# Patient Record
Sex: MALE | Race: WHITE | NOT HISPANIC OR LATINO | Employment: UNEMPLOYED | URBAN - METROPOLITAN AREA
[De-identification: names, ages, dates, MRNs, and addresses within clinical notes are randomized per-mention and may not be internally consistent; named-entity substitution may affect disease eponyms.]

---

## 2018-02-16 ENCOUNTER — OFFICE VISIT (OUTPATIENT)
Dept: WOUND CARE | Facility: HOSPITAL | Age: 83
End: 2018-02-16
Attending: NURSE PRACTITIONER
Payer: MEDICARE

## 2018-02-16 VITALS
SYSTOLIC BLOOD PRESSURE: 128 MMHG | DIASTOLIC BLOOD PRESSURE: 82 MMHG | TEMPERATURE: 98 F | HEART RATE: 62 BPM | RESPIRATION RATE: 18 BRPM

## 2018-02-16 DIAGNOSIS — L97.522 ULCER OF LEFT FOOT, WITH FAT LAYER EXPOSED: ICD-10-CM

## 2018-02-16 DIAGNOSIS — I70.245 ATHSCL NATIVE ARTERIES OF LEFT LEG W ULCERATION OTH PRT FOOT: ICD-10-CM

## 2018-02-16 PROCEDURE — 99202 OFFICE O/P NEW SF 15 MIN: CPT

## 2018-02-16 RX ORDER — ROSUVASTATIN CALCIUM 10 MG/1
10 TABLET, COATED ORAL DAILY
COMMUNITY
End: 2018-10-10

## 2018-02-16 RX ORDER — LEVOTHYROXINE SODIUM 75 UG/1
75 TABLET ORAL DAILY
COMMUNITY

## 2018-02-16 RX ORDER — LORATADINE 10 MG/1
10 TABLET ORAL DAILY
COMMUNITY
End: 2018-10-10

## 2018-02-16 RX ORDER — MEMANTINE HYDROCHLORIDE 10 MG/1
5 TABLET ORAL 2 TIMES DAILY
COMMUNITY
End: 2018-10-10

## 2018-02-16 RX ORDER — ASPIRIN 81 MG/1
81 TABLET ORAL DAILY
COMMUNITY
End: 2019-12-23 | Stop reason: ALTCHOICE

## 2018-02-16 RX ORDER — CLOPIDOGREL BISULFATE 75 MG/1
75 TABLET ORAL DAILY
COMMUNITY
End: 2018-10-10

## 2018-02-16 RX ORDER — ESCITALOPRAM OXALATE 10 MG/1
10 TABLET ORAL DAILY
COMMUNITY
End: 2018-10-10

## 2018-02-16 NOTE — PROGRESS NOTES
[facility]  Wound Care  Progress Note    Problem List Items Addressed This Visit        Derm    Ulcer of left foot, with fat layer exposed    Overview     HPI 85 yr old male with history of PVD, unable to improve circulation via intervention. Pt on plavix and baby asa. Encouraged by cardiology to exercise to improve collateral circulation.    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 He appears well-developed and well-nourished.   HENT:   Head: Normocephalic.   Cardiovascular:   Denies chest pain     Pulmonary/Chest: Effort normal.   Musculoskeletal: He exhibits tenderness.   Tenderness to wound when walking   Neurological: He is alert and oriented to person, place, and time.   Skin: Skin is warm and dry.   Psychiatric: He has a normal mood and affect. His behavior is normal. Judgment and thought content normal.     Pt with arterial wound, will keep wound dry. Instructed to not wet wound with shower. Apperature pad applied and Darco shoe with peg assist insert given to patient. Will use gentian violet to wound.  See wound doc progress notes. Documents will be scanned.        Sophy Mackenzie  [facility]

## 2018-02-23 ENCOUNTER — HOSPITAL ENCOUNTER (OUTPATIENT)
Dept: RADIOLOGY | Facility: HOSPITAL | Age: 83
Discharge: HOME | End: 2018-02-23
Attending: NURSE PRACTITIONER
Payer: MEDICARE

## 2018-02-23 ENCOUNTER — OFFICE VISIT (OUTPATIENT)
Dept: WOUND CARE | Facility: HOSPITAL | Age: 83
End: 2018-02-23
Attending: NURSE PRACTITIONER
Payer: MEDICARE

## 2018-02-23 VITALS
RESPIRATION RATE: 18 BRPM | DIASTOLIC BLOOD PRESSURE: 75 MMHG | SYSTOLIC BLOOD PRESSURE: 130 MMHG | TEMPERATURE: 98 F | HEART RATE: 66 BPM

## 2018-02-23 DIAGNOSIS — I70.245 ATHSCL NATIVE ARTERIES OF LEFT LEG W ULCERATION OTH PRT FOOT: ICD-10-CM

## 2018-02-23 DIAGNOSIS — L97.522 ULCER OF LEFT FOOT, WITH FAT LAYER EXPOSED: Primary | ICD-10-CM

## 2018-02-23 DIAGNOSIS — L97.522 ULCER OF LEFT FOOT, WITH FAT LAYER EXPOSED: ICD-10-CM

## 2018-02-23 PROCEDURE — 99211 OFF/OP EST MAY X REQ PHY/QHP: CPT | Mod: 25

## 2018-02-23 PROCEDURE — 73630 X-RAY EXAM OF FOOT: CPT | Mod: TC,LT

## 2018-02-23 PROCEDURE — 73630 X-RAY EXAM OF FOOT: CPT | Mod: 26,LT,, | Performed by: RADIOLOGY

## 2018-02-23 NOTE — PROGRESS NOTES
[facility]  Wound Care  Progress Note    Problem List Items Addressed This Visit        Derm    Ulcer of left foot, with fat layer exposed - Primary    Overview     HPI 85 yr old male with history of PVD, unable to improve circulation via intervention. Pt on plavix and baby asa. Encouraged by cardiology to exercise to improve collateral circulation.    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 He appears well-developed and well-nourished.   HENT:   Head: Normocephalic.   Pulmonary/Chest: Effort normal.   Musculoskeletal: Normal range of motion.   Neurological: He is alert and oriented to person, place, and time.   Skin: Skin is warm and dry.   Pt's left lateral foot wound is 100% eschar. Very tender to plantar area of wound, will send for a x-ray to r/o osteo. Unable to debride wound due to poor vascular status. Left great toe has ingrown toenail that is red and tender, toenail corner clipped and pus drained from corner. Will prescribe bactroban bid   Psychiatric: He has a normal mood and affect. His behavior is normal. Judgment and thought content normal.       Will recheck pt next week, instructed to call sooner if any problems or concerns arise.  See wound doc progress notes. Documents will be scanned.        Sophy Mackenzie  [facility]

## 2018-03-02 ENCOUNTER — OFFICE VISIT (OUTPATIENT)
Dept: WOUND CARE | Facility: HOSPITAL | Age: 83
End: 2018-03-02
Attending: NURSE PRACTITIONER
Payer: MEDICARE

## 2018-03-02 VITALS
TEMPERATURE: 98 F | SYSTOLIC BLOOD PRESSURE: 120 MMHG | RESPIRATION RATE: 18 BRPM | HEART RATE: 88 BPM | DIASTOLIC BLOOD PRESSURE: 73 MMHG

## 2018-03-02 DIAGNOSIS — I70.245 ATHSCL NATIVE ARTERIES OF LEFT LEG W ULCERATION OTH PRT FOOT: ICD-10-CM

## 2018-03-02 DIAGNOSIS — L97.522 ULCER OF LEFT FOOT, WITH FAT LAYER EXPOSED: Primary | ICD-10-CM

## 2018-03-02 PROCEDURE — 99211 OFF/OP EST MAY X REQ PHY/QHP: CPT

## 2018-03-02 NOTE — PROGRESS NOTES
[facility]  Wound Care  Progress Note    Problem List Items Addressed This Visit        Derm    Ulcer of left foot, with fat layer exposed - Primary    Overview     HPI 85 yr old male with history of PVD, unable to improve circulation via intervention. Pt on plavix and baby asa. Encouraged by cardiology to exercise to improve collateral circulation.    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 He appears well-developed and well-nourished.   Pt denies chest pain or shortness of breath, appetite is good and no history of fever or chills.   HENT:   Head: Normocephalic.   Pulmonary/Chest: Effort normal.   Musculoskeletal: Normal range of motion.   Neurological: He is alert and oriented to person, place, and time.   Skin: Skin is warm and dry. Capillary refill takes 2 to 3 seconds.   Wound to plantar left foot is mostly dry with a small area that is open with scant drainage, not as painful as last week, due to pt's vascular history, will not debride and will continue to dry it out with gentian violet.    Psychiatric: He has a normal mood and affect. His behavior is normal. Thought content normal.   Nursing note and vitals reviewed.      Will continue to monitor wound weekly  See wound doc progress notes. Documents will be scanned.        Sophy Mackenzie  [facility]

## 2018-03-09 ENCOUNTER — OFFICE VISIT (OUTPATIENT)
Dept: WOUND CARE | Facility: HOSPITAL | Age: 83
End: 2018-03-09
Attending: NURSE PRACTITIONER
Payer: MEDICARE

## 2018-03-09 DIAGNOSIS — I70.245 ATHSCL NATIVE ARTERIES OF LEFT LEG W ULCERATION OTH PRT FOOT: ICD-10-CM

## 2018-03-09 DIAGNOSIS — L97.522 ULCER OF LEFT FOOT, WITH FAT LAYER EXPOSED: Primary | ICD-10-CM

## 2018-03-09 PROCEDURE — 99211 OFF/OP EST MAY X REQ PHY/QHP: CPT

## 2018-03-09 NOTE — PROGRESS NOTES
[facility]  Wound Care  Progress Note    Problem List Items Addressed This Visit        Derm    Ulcer of left foot, with fat layer exposed - Primary    Overview     HPI 85 yr old male with history of PVD, unable to improve circulation via intervention. Pt on plavix and baby asa. Encouraged by cardiology to exercise to improve collateral circulation.    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 He appears well-developed and well-nourished.   HENT:   Head: Normocephalic.   Pulmonary/Chest: Effort normal.   Musculoskeletal: Normal range of motion.   Neurological: He is alert and oriented to person, place, and time.   Skin: Skin is warm and dry.   Pt with arterial ulcer to left foot, vascular status is optimized but pt has significant blockage, rides his exercise bike to increase collateral circulation   Psychiatric: He has a normal mood and affect. His behavior is normal. Judgment and thought content normal.     Will re-check in 2 weeks since wound is vascular and cannot debrided.  See wound doc progress notes. Documents will be scanned.        Sophy Mackenzie  [facility]

## 2018-03-12 VITALS — DIASTOLIC BLOOD PRESSURE: 70 MMHG | HEART RATE: 86 BPM | SYSTOLIC BLOOD PRESSURE: 120 MMHG | RESPIRATION RATE: 18 BRPM

## 2018-03-23 ENCOUNTER — OFFICE VISIT (OUTPATIENT)
Dept: WOUND CARE | Facility: HOSPITAL | Age: 83
End: 2018-03-23
Attending: NURSE PRACTITIONER
Payer: MEDICARE

## 2018-03-23 VITALS — SYSTOLIC BLOOD PRESSURE: 143 MMHG | HEART RATE: 76 BPM | DIASTOLIC BLOOD PRESSURE: 86 MMHG

## 2018-03-23 DIAGNOSIS — L97.522 ULCER OF LEFT FOOT, WITH FAT LAYER EXPOSED: ICD-10-CM

## 2018-03-23 DIAGNOSIS — I70.245 ATHSCL NATIVE ARTERIES OF LEFT LEG W ULCERATION OTH PRT FOOT: Primary | ICD-10-CM

## 2018-03-23 PROCEDURE — 99211 OFF/OP EST MAY X REQ PHY/QHP: CPT

## 2018-03-23 NOTE — PROGRESS NOTES
[facility]  Wound Care  Progress Note    Problem List Items Addressed This Visit        Derm    Ulcer of left foot, with fat layer exposed    Overview     HPI 85 yr old male with history of PVD, unable to improve circulation via intervention. Pt on plavix and baby asa. Encouraged by cardiology to exercise to improve collateral circulation.    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 He appears well-developed and well-nourished.   Pulmonary/Chest: Effort normal.   Musculoskeletal: Normal range of motion.   Neurological: He is alert and oriented to person, place, and time.   Skin: Skin is warm and dry.   arterail ulcer to left lateral foot with fat layer exposed, had an apperature pad around wound but it caused the wound to bulge through the pad due to edema in his foot. Will stop apperature pad and cover wound with silver alginate, pt wearing a croc shoe, encouraged to wear the Darco shoe with Peg assist insert that was provided to him.    Psychiatric: He has a normal mood and affect. His behavior is normal. Judgment and thought content normal.     Will re-check in 2 weeks, pt unable to come next week because he has plans for Good Friday.    See wound doc progress notes. Documents will be scanned.        Sophy Mackenzie  [facility]

## 2018-03-30 ENCOUNTER — OFFICE VISIT (OUTPATIENT)
Dept: WOUND CARE | Facility: HOSPITAL | Age: 83
End: 2018-03-30
Attending: NURSE PRACTITIONER
Payer: MEDICARE

## 2018-03-30 VITALS
DIASTOLIC BLOOD PRESSURE: 86 MMHG | TEMPERATURE: 98 F | SYSTOLIC BLOOD PRESSURE: 145 MMHG | RESPIRATION RATE: 18 BRPM | HEART RATE: 58 BPM

## 2018-03-30 DIAGNOSIS — I70.245 ATHSCL NATIVE ARTERIES OF LEFT LEG W ULCERATION OTH PRT FOOT: Primary | ICD-10-CM

## 2018-03-30 DIAGNOSIS — L97.522 ULCER OF LEFT FOOT, WITH FAT LAYER EXPOSED: ICD-10-CM

## 2018-03-30 PROCEDURE — 99211 OFF/OP EST MAY X REQ PHY/QHP: CPT

## 2018-03-30 NOTE — PROGRESS NOTES
[facility]  Wound Care  Progress Note    Problem List Items Addressed This Visit        Derm    Ulcer of left foot, with fat layer exposed    Overview     HPI 85 yr old male with history of PVD, unable to improve circulation via intervention. Pt on plavix and baby asa. Encouraged by cardiology to exercise to improve collateral circulation.    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 He appears well-developed and well-nourished.   HENT:   Head: Normocephalic.   Abdominal: Soft.   Musculoskeletal: Normal range of motion.   Neurological: He is alert and oriented to person, place, and time.   Skin: Skin is warm and dry.   Wound to left foot with fat layer exposed due to PAD. Looks good with small amount of slough   Psychiatric: He has a normal mood and affect. His behavior is normal. Judgment and thought content normal.     Will re-check in 2 weeks  See wound doc progress notes. Documents will be scanned.        Sophy Mackenzie  [facility]

## 2018-04-13 ENCOUNTER — OFFICE VISIT (OUTPATIENT)
Dept: WOUND CARE | Facility: HOSPITAL | Age: 83
End: 2018-04-13
Attending: NURSE PRACTITIONER
Payer: MEDICARE

## 2018-04-13 VITALS
HEART RATE: 48 BPM | RESPIRATION RATE: 18 BRPM | SYSTOLIC BLOOD PRESSURE: 152 MMHG | DIASTOLIC BLOOD PRESSURE: 75 MMHG | TEMPERATURE: 98 F

## 2018-04-13 DIAGNOSIS — L97.522 ULCER OF LEFT FOOT, WITH FAT LAYER EXPOSED: Primary | ICD-10-CM

## 2018-04-13 PROCEDURE — 27201912 HC WOUND CARE DEBRIDEMENT SUPPLIES

## 2018-04-13 PROCEDURE — 11042 DBRDMT SUBQ TIS 1ST 20SQCM/<: CPT

## 2018-04-13 NOTE — PROGRESS NOTES
[facility]  Wound Care  Progress Note    Problem List Items Addressed This Visit        Derm    Ulcer of left foot, with fat layer exposed - Primary    Overview     HPI 85 yr old male with history of PVD, unable to improve circulation via intervention. Pt on plavix and baby asa. Encouraged by cardiology to exercise to improve collateral circulation.    Location: left lateral foot    Duration: 1-5-2018    Context: arterial    Associated Signs & Symptoms: pain    Timing: when walking    Severity: 5/10    Quality: sharp and burning    Modifying Factors: rest                 Physical Exam   Constitutional: He is oriented to person, place, and time. He appears well-developed and well-nourished.   HENT:   Head: Normocephalic.   Musculoskeletal: Normal range of motion.   Neurological: He is alert and oriented to person, place, and time.   Skin: Skin is warm and dry.   Wound is doing well, fat layer exposed with slough, wound is arterial . Loose slough gently removed from wound bed   Psychiatric: He has a normal mood and affect. His behavior is normal. Judgment and thought content normal.   will re-check next week    See wound doc progress notes. Documents will be scanned.        Sophy Mackenzie  [facility]

## 2018-04-20 ENCOUNTER — OFFICE VISIT (OUTPATIENT)
Dept: WOUND CARE | Facility: HOSPITAL | Age: 83
End: 2018-04-20
Attending: NURSE PRACTITIONER
Payer: MEDICARE

## 2018-04-20 VITALS
DIASTOLIC BLOOD PRESSURE: 81 MMHG | HEART RATE: 41 BPM | TEMPERATURE: 98 F | RESPIRATION RATE: 18 BRPM | SYSTOLIC BLOOD PRESSURE: 169 MMHG

## 2018-04-20 DIAGNOSIS — I70.245 ATHSCL NATIVE ARTERIES OF LEFT LEG W ULCERATION OTH PRT FOOT: ICD-10-CM

## 2018-04-20 DIAGNOSIS — L97.522 ULCER OF LEFT FOOT, WITH FAT LAYER EXPOSED: Primary | ICD-10-CM

## 2018-04-20 PROCEDURE — 11042 DBRDMT SUBQ TIS 1ST 20SQCM/<: CPT

## 2018-04-20 PROCEDURE — 27201912 HC WOUND CARE DEBRIDEMENT SUPPLIES

## 2018-04-20 NOTE — PROGRESS NOTES
[facility]  Wound Care  Progress Note    Problem List Items Addressed This Visit        Derm    Ulcer of left foot, with fat layer exposed - Primary    Overview     HPI 85 yr old male with history of PVD, unable to improve circulation via intervention. Pt on plavix and baby asa. Encouraged by cardiology to exercise to improve collateral circulation.    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 He appears well-developed and well-nourished.   HENT:   Head: Normocephalic.   Pulmonary/Chest: Effort normal.   Musculoskeletal: Normal range of motion.   Neurological: He is alert and oriented to person, place, and time.   Skin: Skin is warm and dry.   Arterial wound to left foot with adipose and loose slough. Carefully removed the slough, will continue mepilex ag    Psychiatric: He has a normal mood and affect. His behavior is normal. Judgment and thought content normal.     Will re-check in 2 weeks  See wound doc progress notes. Documents will be scanned.        Sophy Mackenzie  [facility]

## 2018-05-04 ENCOUNTER — OFFICE VISIT (OUTPATIENT)
Dept: WOUND CARE | Facility: HOSPITAL | Age: 83
End: 2018-05-04
Attending: NURSE PRACTITIONER
Payer: MEDICARE

## 2018-05-04 VITALS
TEMPERATURE: 98 F | RESPIRATION RATE: 18 BRPM | HEART RATE: 61 BPM | DIASTOLIC BLOOD PRESSURE: 79 MMHG | SYSTOLIC BLOOD PRESSURE: 127 MMHG

## 2018-05-04 DIAGNOSIS — L97.522 ULCER OF LEFT FOOT, WITH FAT LAYER EXPOSED: Primary | ICD-10-CM

## 2018-05-04 PROCEDURE — 27201912 HC WOUND CARE DEBRIDEMENT SUPPLIES

## 2018-05-04 PROCEDURE — 11042 DBRDMT SUBQ TIS 1ST 20SQCM/<: CPT

## 2018-05-04 NOTE — PROGRESS NOTES
[facility]  Wound Care  Progress Note    Problem List Items Addressed This Visit        Derm    Ulcer of left foot, with fat layer exposed - Primary    Overview     HPI 85 yr old male with history of PVD, unable to improve circulation via intervention. Pt on plavix and baby asa. Encouraged by cardiology to exercise to improve collateral circulation.    Location: left lateral foot    Duration: 1-5-2018    Context: arterial    Associated Signs & Symptoms: pain    Timing: when walking    Severity: 5/10    Quality: sharp and burning    Modifying Factors: rest                 Physical Exam   Constitutional: He is oriented to person, place, and time. He appears well-developed and well-nourished.   HENT:   Head: Normocephalic.   Pulmonary/Chest: Effort normal.   Musculoskeletal: Normal range of motion.   Neurological: He is alert and oriented to person, place, and time.   Skin: Skin is warm and dry.   Arterial wound to left foot with adipose and loose slough.   Psychiatric: He has a normal mood and affect. His behavior is normal. Judgment and thought content normal.     Will re-check in 2 weeks, will continue current treatment. Loose slough carefully removed. Continue to offload with Darco shoe.  See wound doc progress notes. Documents will be scanned.        Sophy Mackenzie  [facility]

## 2018-05-18 ENCOUNTER — OFFICE VISIT (OUTPATIENT)
Dept: WOUND CARE | Facility: HOSPITAL | Age: 83
End: 2018-05-18
Attending: NURSE PRACTITIONER
Payer: MEDICARE

## 2018-05-18 VITALS
SYSTOLIC BLOOD PRESSURE: 138 MMHG | DIASTOLIC BLOOD PRESSURE: 72 MMHG | HEART RATE: 48 BPM | RESPIRATION RATE: 18 BRPM | TEMPERATURE: 98 F

## 2018-05-18 DIAGNOSIS — I70.245 ATHSCL NATIVE ARTERIES OF LEFT LEG W ULCERATION OTH PRT FOOT: Primary | ICD-10-CM

## 2018-05-18 DIAGNOSIS — L97.522 ULCER OF LEFT FOOT, WITH FAT LAYER EXPOSED: ICD-10-CM

## 2018-05-18 PROCEDURE — 11042 DBRDMT SUBQ TIS 1ST 20SQCM/<: CPT

## 2018-05-18 PROCEDURE — 27201912 HC WOUND CARE DEBRIDEMENT SUPPLIES

## 2018-05-18 NOTE — PROGRESS NOTES
[facility]  Wound Care  Progress Note    Problem List Items Addressed This Visit        Derm    Ulcer of left foot, with fat layer exposed    Overview     HPI 85 yr old male with history of PVD, unable to improve circulation via intervention. Pt on plavix and baby asa. Encouraged by cardiology to exercise to improve collateral circulation.    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 He appears well-developed and well-nourished.   HENT:   Head: Normocephalic.   Pulmonary/Chest: Effort normal.   Neurological: He is alert and oriented to person, place, and time.   Skin: Skin is warm and dry.   Arterial ulcer to left lateral foot. Adipose and slough noted. Loose slough carefully removed, no bleeding noted. Darco shoe is causing pt to have plantar fasciitis, Pt ok'd to wear a soft, supportive, high quality shoe. Offload pressure as much as possible.   Psychiatric: He has a normal mood and affect. His behavior is normal. Judgment and thought content normal.     Will come in 2 weeks for a nurse visit. Continue current treatment.  See wound doc progress notes. Documents will be scanned.        Sophy Mackenzie  [facility]

## 2018-05-25 ENCOUNTER — OFFICE VISIT (OUTPATIENT)
Dept: WOUND CARE | Facility: HOSPITAL | Age: 83
End: 2018-05-25
Attending: NURSE PRACTITIONER
Payer: MEDICARE

## 2018-05-25 VITALS — DIASTOLIC BLOOD PRESSURE: 88 MMHG | HEART RATE: 88 BPM | SYSTOLIC BLOOD PRESSURE: 152 MMHG | RESPIRATION RATE: 18 BRPM

## 2018-05-25 DIAGNOSIS — I70.245 ATHSCL NATIVE ARTERIES OF LEFT LEG W ULCERATION OTH PRT FOOT: Primary | ICD-10-CM

## 2018-05-25 PROCEDURE — 11042 DBRDMT SUBQ TIS 1ST 20SQCM/<: CPT

## 2018-05-25 PROCEDURE — 27201912 HC WOUND CARE DEBRIDEMENT SUPPLIES

## 2018-05-25 NOTE — PROGRESS NOTES
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 He appears well-developed and well-nourished.   Pulmonary/Chest: Effort normal.   Musculoskeletal: Normal range of motion.   Pt c/o pain in his arch of left foot. Wears crocs because darco shoe bothered him. Encouraged to get shoes big enough and with good arch support.    Neurological: He is alert and oriented to person, place, and time.   Skin: Skin is warm and dry.   Arterial ulcer to left lateral foot, doing well and getting smaller, adipose and a small amount of slough noted, healthy granulation tissue noted.   Psychiatric: He has a normal mood and affect. His behavior is normal. Judgment and thought content normal.     Small amount of slough gently removed, no bleeding noted, will continue mepilex ag and will re-check in 2 weeks, pt offered a nurse visit in 1 week and stated he's come back in 2 weeks.  See wound doc progress notes. Documents will be scanned.        Sophy Mackenzie  [facility]

## 2018-06-08 ENCOUNTER — OFFICE VISIT (OUTPATIENT)
Dept: WOUND CARE | Facility: HOSPITAL | Age: 83
End: 2018-06-08
Attending: NURSE PRACTITIONER
Payer: MEDICARE

## 2018-06-08 VITALS
SYSTOLIC BLOOD PRESSURE: 129 MMHG | HEART RATE: 63 BPM | DIASTOLIC BLOOD PRESSURE: 81 MMHG | RESPIRATION RATE: 18 BRPM | TEMPERATURE: 98 F

## 2018-06-08 DIAGNOSIS — L97.522 ULCER OF LEFT FOOT, WITH FAT LAYER EXPOSED: Primary | ICD-10-CM

## 2018-06-08 PROCEDURE — 27201912 HC WOUND CARE DEBRIDEMENT SUPPLIES

## 2018-06-08 PROCEDURE — 11042 DBRDMT SUBQ TIS 1ST 20SQCM/<: CPT

## 2018-06-08 NOTE — PROGRESS NOTES
[facility]  Wound Care  Progress Note    Problem List Items Addressed This Visit        Derm    Ulcer of left foot, with fat layer exposed - Primary    Overview     HPI 85 yr old male with history of PVD, unable to improve circulation via intervention. Pt on plavix and baby asa. Encouraged by cardiology to exercise to improve collateral circulation.    Location: left lateral foot    Duration: 1-5-2018    Context: arterial    Associated Signs & Symptoms: pain    Timing: when walking    Severity: 5/10    Quality: sharp and burning    Modifying Factors: rest  6-8-18: wound is doing well, denies pain to foot               Physical Exam   Constitutional: He is oriented to person, place, and time. He appears well-developed and well-nourished.   HENT:   Head: Normocephalic.   Pulmonary/Chest: Effort normal.   Musculoskeletal: Normal range of motion.   Neurological: He is alert and oriented to person, place, and time.   Skin: Skin is warm and dry.   Arterial ulcer to left foot with adipose and small amount of slough, foot is warm and dry and pink   Psychiatric: He has a normal mood and affect. His behavior is normal. Judgment and thought content normal.   will re-check in 2 weeks per family's request, unable to come weekly. Will continue current treatment       See wound doc progress notes. Documents will be scanned.        Sophy Mackenzie  [facility]

## 2018-06-22 ENCOUNTER — OFFICE VISIT (OUTPATIENT)
Dept: WOUND CARE | Facility: HOSPITAL | Age: 83
End: 2018-06-22
Attending: NURSE PRACTITIONER
Payer: MEDICARE

## 2018-06-22 ENCOUNTER — HOSPITAL ENCOUNTER (OUTPATIENT)
Dept: RADIOLOGY | Facility: HOSPITAL | Age: 83
Discharge: HOME | End: 2018-06-22
Attending: NURSE PRACTITIONER
Payer: MEDICARE

## 2018-06-22 VITALS — RESPIRATION RATE: 18 BRPM | DIASTOLIC BLOOD PRESSURE: 85 MMHG | SYSTOLIC BLOOD PRESSURE: 157 MMHG | HEART RATE: 58 BPM

## 2018-06-22 DIAGNOSIS — L97.522 ULCER OF LEFT FOOT, WITH FAT LAYER EXPOSED: Primary | ICD-10-CM

## 2018-06-22 DIAGNOSIS — L97.522 ULCER OF LEFT FOOT, WITH FAT LAYER EXPOSED: ICD-10-CM

## 2018-06-22 PROCEDURE — 99212 OFFICE O/P EST SF 10 MIN: CPT | Mod: 25

## 2018-06-22 PROCEDURE — 73630 X-RAY EXAM OF FOOT: CPT | Mod: 26,LT,, | Performed by: RADIOLOGY

## 2018-06-22 PROCEDURE — 73630 X-RAY EXAM OF FOOT: CPT | Mod: TC,LT

## 2018-06-22 NOTE — PROGRESS NOTES
[facility]  Wound Care  Progress Note    Problem List Items Addressed This Visit        Derm    Ulcer of left foot, with fat layer exposed - Primary    Overview     HPI 85 yr old male with history of PVD, unable to improve circulation via intervention. Pt on plavix and baby asa. Encouraged by cardiology to exercise to improve collateral circulation.    Location: left lateral foot    Duration: 1-5-2018    Context: arterial    Associated Signs & Symptoms: pain    Timing: when walking    Severity: 5/10    Quality: sharp and burning    Modifying Factors: rest  6-8-18: wound is doing well, denies pain to foot                 Physical Exam   Constitutional: He is oriented to person, place, and time. He appears well-developed and well-nourished.   HENT:   Head: Normocephalic.   Musculoskeletal: Normal range of motion.   Neurological: He is alert and oriented to person, place, and time.   Skin: Skin is warm and dry.   Arterial ulcer to left lateral foot with adipose and non-viable tissue exposed. Appears to have fascia exposed in the center of the wound.    Psychiatric: He has a normal mood and affect. His behavior is normal. Judgment and thought content normal.     Will send for a xray due to fascia exposed in the center of the wound. Will change to collagen qod and will re-check in one week.  See wound doc progress notes. Documents will be scanned.        Sophy Mackenzie  [facility]

## 2018-06-29 ENCOUNTER — OFFICE VISIT (OUTPATIENT)
Dept: WOUND CARE | Facility: HOSPITAL | Age: 83
End: 2018-06-29
Attending: NURSE PRACTITIONER
Payer: MEDICARE

## 2018-06-29 VITALS — DIASTOLIC BLOOD PRESSURE: 74 MMHG | HEART RATE: 68 BPM | RESPIRATION RATE: 18 BRPM | SYSTOLIC BLOOD PRESSURE: 145 MMHG

## 2018-06-29 DIAGNOSIS — L97.522 ULCER OF LEFT FOOT, WITH FAT LAYER EXPOSED: Primary | ICD-10-CM

## 2018-06-29 DIAGNOSIS — I70.245 ATHSCL NATIVE ARTERIES OF LEFT LEG W ULCERATION OTH PRT FOOT: ICD-10-CM

## 2018-06-29 PROCEDURE — 99211 OFF/OP EST MAY X REQ PHY/QHP: CPT

## 2018-06-29 NOTE — PATIENT INSTRUCTIONS
Please see wound care instructions which have been provided separately.   Please see wound care instructions which have been provided separately.

## 2018-06-29 NOTE — PROGRESS NOTES
[facility]  Wound Care  Progress Note    Problem List Items Addressed This Visit        Derm    Ulcer of left foot, with fat layer exposed - Primary    Overview     HPI 85 yr old male with history of PVD, unable to improve circulation via intervention. Pt on plavix and baby asa. Encouraged by cardiology to exercise to improve collateral circulation.    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 He appears well-developed and well-nourished.   HENT:   Head: Normocephalic.   Pulmonary/Chest: Effort normal.   Musculoskeletal: Normal range of motion.   Neurological: He is alert and oriented to person, place, and time.   Skin: Skin is warm and dry.   Arterial ulcer to left lateral foot, adipose exposed with periosteum in the center, xray was negative for osteo.   Psychiatric: He has a normal mood and affect. His behavior is normal. Judgment and thought content normal.     Will continue collagen and will re-check in 2 weeks per pt's request.  See wound doc progress notes. Documents will be scanned.        Sophy Mackenzie  [facility]

## 2018-07-13 ENCOUNTER — OFFICE VISIT (OUTPATIENT)
Dept: WOUND CARE | Facility: HOSPITAL | Age: 83
End: 2018-07-13
Attending: NURSE PRACTITIONER
Payer: MEDICARE

## 2018-07-13 VITALS — HEART RATE: 60 BPM | DIASTOLIC BLOOD PRESSURE: 87 MMHG | SYSTOLIC BLOOD PRESSURE: 145 MMHG

## 2018-07-13 DIAGNOSIS — I70.245 ATHSCL NATIVE ARTERIES OF LEFT LEG W ULCERATION OTH PRT FOOT: ICD-10-CM

## 2018-07-13 DIAGNOSIS — L97.522 ULCER OF LEFT FOOT, WITH FAT LAYER EXPOSED: Primary | ICD-10-CM

## 2018-07-13 PROCEDURE — 99212 OFFICE O/P EST SF 10 MIN: CPT

## 2018-07-13 NOTE — PROGRESS NOTES
[facility]  Wound Care  Progress Note    Problem List Items Addressed This Visit        Derm    Ulcer of left foot, with fat layer exposed - Primary    Overview     HPI 85 yr old male with history of PVD, unable to improve circulation via intervention. Pt on plavix and baby asa. Encouraged by cardiology to exercise to improve collateral circulation.    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 He appears well-developed and well-nourished.   HENT:   Head: Normocephalic.   Pulmonary/Chest: Effort normal.   Musculoskeletal: Normal range of motion.   Neurological: He is alert and oriented to person, place, and time.   Skin: Skin is warm and dry.   Arterial wound to left lateral foot with adipose and non-viable tissue.   Psychiatric: He has a normal mood and affect. His behavior is normal. Judgment and thought content normal.     Callus removed from roberto wound, will continue promogran qod and will recheck in 2 weeks, pt's fly has been bringing him to hyperbarics somewhere in [place] twice a day.  See wound doc progress notes. Documents will be scanned.        Sophy Mackenzie  [facility]

## 2018-07-27 ENCOUNTER — OFFICE VISIT (OUTPATIENT)
Dept: WOUND CARE | Facility: HOSPITAL | Age: 83
End: 2018-07-27
Attending: NURSE PRACTITIONER
Payer: MEDICARE

## 2018-07-27 DIAGNOSIS — L97.522 ULCER OF LEFT FOOT, WITH FAT LAYER EXPOSED: Primary | ICD-10-CM

## 2018-07-27 DIAGNOSIS — I70.245 ATHSCL NATIVE ARTERIES OF LEFT LEG W ULCERATION OTH PRT FOOT: ICD-10-CM

## 2018-07-27 PROCEDURE — 99211 OFF/OP EST MAY X REQ PHY/QHP: CPT

## 2018-07-27 NOTE — PROGRESS NOTES
[facility]  Wound Care  Progress Note    Problem List Items Addressed This Visit        Derm    Ulcer of left foot, with fat layer exposed - Primary    Overview     HPI 85 yr old male with history of PVD, unable to improve circulation via intervention. Pt on plavix and baby asa. Encouraged by cardiology to exercise to improve collateral circulation.    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 He appears well-developed and well-nourished.   HENT:   Head: Normocephalic.   Pulmonary/Chest: Effort normal.   Musculoskeletal: Normal range of motion.   Neurological: He is alert and oriented to person, place, and time.   Skin: Skin is warm and dry.   Chronic wound to left lateral foot due to PVD with adipose and non-viable tissue   Psychiatric: He has a normal mood and affect. His behavior is normal. Judgment and thought content normal.   Vitals reviewed.    Pt is having hyperbaric treatments at a chiropractor's office in [place] daily. Pt's fly found this place on their own. They also bring the pt to a holistic MD in [place] and had stem cells removed and then re-injected near the wound. Will continue collagen qod and will recheck in one week.  See wound doc progress notes. Documents will be scanned.        Sophy Mackenzie  [facility]

## 2018-08-10 ENCOUNTER — OFFICE VISIT (OUTPATIENT)
Dept: WOUND CARE | Facility: HOSPITAL | Age: 83
End: 2018-08-10
Attending: NURSE PRACTITIONER
Payer: MEDICARE

## 2018-08-10 VITALS
TEMPERATURE: 98 F | DIASTOLIC BLOOD PRESSURE: 76 MMHG | RESPIRATION RATE: 18 BRPM | SYSTOLIC BLOOD PRESSURE: 167 MMHG | HEART RATE: 56 BPM

## 2018-08-10 DIAGNOSIS — L97.522 ULCER OF LEFT FOOT, WITH FAT LAYER EXPOSED: Primary | ICD-10-CM

## 2018-08-10 DIAGNOSIS — I70.245 ATHSCL NATIVE ARTERIES OF LEFT LEG W ULCERATION OTH PRT FOOT: ICD-10-CM

## 2018-08-10 PROCEDURE — 99211 OFF/OP EST MAY X REQ PHY/QHP: CPT

## 2018-08-24 ENCOUNTER — OFFICE VISIT (OUTPATIENT)
Dept: WOUND CARE | Facility: HOSPITAL | Age: 83
End: 2018-08-24
Attending: NURSE PRACTITIONER
Payer: MEDICARE

## 2018-08-24 VITALS
SYSTOLIC BLOOD PRESSURE: 151 MMHG | TEMPERATURE: 98 F | HEART RATE: 51 BPM | DIASTOLIC BLOOD PRESSURE: 82 MMHG | RESPIRATION RATE: 18 BRPM

## 2018-08-24 DIAGNOSIS — L97.522 ULCER OF LEFT FOOT, WITH FAT LAYER EXPOSED: Primary | ICD-10-CM

## 2018-08-24 DIAGNOSIS — I70.245 ATHSCL NATIVE ARTERIES OF LEFT LEG W ULCERATION OTH PRT FOOT: ICD-10-CM

## 2018-08-24 PROCEDURE — 97597 DBRDMT OPN WND 1ST 20 CM/<: CPT

## 2018-08-24 NOTE — PROGRESS NOTES
[facility]  Wound Care  Progress Note    Problem List Items Addressed This Visit        Derm    Ulcer of left foot, with fat layer exposed - Primary    Overview     HPI 85 yr old male with history of PVD, unable to improve circulation via intervention. Pt on plavix and baby asa. Encouraged by cardiology to exercise to improve collateral circulation.    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 He appears well-developed and well-nourished.   HENT:   Head: Normocephalic.   Pulmonary/Chest: Effort normal.   Musculoskeletal: Normal range of motion.   Neurological: He is alert and oriented to person, place, and time.   Skin: Skin is warm and dry.   Arterial ulcer to left lateral distal foot with adipose exposed, but getting smaller.   Psychiatric: His behavior is normal. Judgment and thought content normal.   Vitals reviewed.  callous debrided around wound, will continue collagen and will recheck in 2 weeks. Pt continues to use HBO chamber at his home daily.     See wound doc progress notes. Documents will be scanned.        Sophy Mackenzie  [facility]

## 2018-09-07 ENCOUNTER — OFFICE VISIT (OUTPATIENT)
Dept: WOUND CARE | Facility: HOSPITAL | Age: 83
End: 2018-09-07
Attending: NURSE PRACTITIONER
Payer: MEDICARE

## 2018-09-07 VITALS — DIASTOLIC BLOOD PRESSURE: 78 MMHG | RESPIRATION RATE: 18 BRPM | HEART RATE: 49 BPM | SYSTOLIC BLOOD PRESSURE: 143 MMHG

## 2018-09-07 DIAGNOSIS — I70.245 ATHSCL NATIVE ARTERIES OF LEFT LEG W ULCERATION OTH PRT FOOT: ICD-10-CM

## 2018-09-07 DIAGNOSIS — L97.522 ULCER OF LEFT FOOT, WITH FAT LAYER EXPOSED: Primary | ICD-10-CM

## 2018-09-07 PROCEDURE — 99211 OFF/OP EST MAY X REQ PHY/QHP: CPT

## 2018-09-07 NOTE — PROGRESS NOTES
[facility]  Wound Care  Progress Note    Problem List Items Addressed This Visit        Derm    Ulcer of left foot, with fat layer exposed - Primary    Overview     HPI 85 yr old male with history of PVD, unable to improve circulation via intervention. Pt on plavix and baby asa. Encouraged by cardiology to exercise to improve collateral circulation.    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 He appears well-developed and well-nourished.   HENT:   Head: Normocephalic.   Pulmonary/Chest: Effort normal.   Musculoskeletal: Normal range of motion.   Neurological: He is alert and oriented to person, place, and time.   Skin: Skin is warm and dry.   Arterial ulcer to left lateral distal foot with adipose exposed.   Psychiatric: He has a normal mood and affect. His behavior is normal. Judgment and thought content normal.   wound is doing well, pt continues to use his home hyperbaric treatments, will continue collagen qod, fly asked if they can try manuka honey if they buy it, told them it was fine to try it without the collagen. Will recheck in 3 weeks, offload pressure at all times.    See wound doc progress notes. Documents will be scanned.        Sophy Mackenzie  [facility]

## 2018-09-20 ENCOUNTER — OFFICE VISIT (OUTPATIENT)
Dept: WOUND CARE | Facility: HOSPITAL | Age: 83
End: 2018-09-20
Attending: SURGERY
Payer: MEDICARE

## 2018-09-20 VITALS
RESPIRATION RATE: 18 BRPM | HEART RATE: 63 BPM | SYSTOLIC BLOOD PRESSURE: 153 MMHG | TEMPERATURE: 98 F | DIASTOLIC BLOOD PRESSURE: 84 MMHG

## 2018-09-20 DIAGNOSIS — I70.245 ATHSCL NATIVE ARTERIES OF LEFT LEG W ULCERATION OTH PRT FOOT: ICD-10-CM

## 2018-09-20 DIAGNOSIS — L03.119 CELLULITIS OF FOOT: ICD-10-CM

## 2018-09-20 DIAGNOSIS — L97.522 ULCER OF LEFT FOOT, WITH FAT LAYER EXPOSED: Primary | ICD-10-CM

## 2018-09-20 DIAGNOSIS — L03.116 CELLULITIS OF LEFT FOOT: ICD-10-CM

## 2018-09-20 PROCEDURE — 99499 UNLISTED E&M SERVICE: CPT | Mod: ,,, | Performed by: SURGERY

## 2018-09-20 PROCEDURE — 99211 OFF/OP EST MAY X REQ PHY/QHP: CPT

## 2018-09-20 NOTE — PROGRESS NOTES
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 Pt on plavix and baby asa. Encouraged by cardiology to exercise to improve collateral circulation.    Location: left lateral foot    Duration: 1-5-2018    Context: arterial    Associated Signs & Symptoms: pain    Timing: when walking    Severity: 5/10    Quality: sharp and burning    Modifying Factors: rest  6-8-18: wound is doing well, denies pain to foot    8-24-18: pt here for wound care f/u for arterial ulcer to left lateral distal foot  9-7-18: pt here for wound care f/u for arterial ulcer to left lateral distal foot         Cellulitis of foot    Overview     Pt seen by me for first time. Has severe cellulitis of left foot. Needs IV antibiotics and admit. Dr. GISELE Blair to admit.            Other Visit Diagnoses     Cellulitis of left foot              See wound doc progress notes. Documents will be scanned.        Leon Ziegler  [facility]

## 2018-10-01 ENCOUNTER — OFFICE VISIT (OUTPATIENT)
Dept: WOUND CARE | Facility: HOSPITAL | Age: 83
End: 2018-10-01
Attending: SURGERY
Payer: MEDICARE

## 2018-10-01 DIAGNOSIS — L97.526 NON-PRESSURE CHRONIC ULCER OF OTHER PART OF LEFT FOOT WITH BONE INVOLVEMENT WITHOUT EVIDENCE OF NECROSIS: ICD-10-CM

## 2018-10-01 DIAGNOSIS — I70.245 ATHSCL NATIVE ARTERIES OF LEFT LEG W ULCERATION OTH PRT FOOT: ICD-10-CM

## 2018-10-01 PROCEDURE — 27201912 HC WOUND CARE DEBRIDEMENT SUPPLIES

## 2018-10-01 PROCEDURE — 11042 DBRDMT SUBQ TIS 1ST 20SQCM/<: CPT

## 2018-10-01 PROCEDURE — 99499 UNLISTED E&M SERVICE: CPT | Mod: ,,, | Performed by: SURGERY

## 2018-10-01 NOTE — PROGRESS NOTES
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 Pt on plavix and baby asa. Encouraged by cardiology to exercise to improve collateral circulation.    Location: left lateral foot    Duration: 1-5-2018    Context: arterial    Associated Signs & Symptoms:  Minimal pain    Timing: when walking    Severity: 3/10    Quality: sharp and burning    The patient was evaluated 09/20/2018 by Dr. Ziegler.  He was found to have what appeared to be soft tissue infection of the left foot associated with his left ft wound. He was admitted to [facility] for treatment.  He was determined to have an open metatarsophalangeal joint.  He was taken to the operating room where he underwent resection of the 5th metatarsal head and bone debridement.  A wound VAC was initiated however he could not tolerate it due to pain. He was treated based on culture results with oral antibiotic therapy upon discharge from the hospital.  While hospitalized, he did undergo repeat angiogram and attempt at crossing the occluded popliteal artery both from above and from below.  This was not successful by Dr. Edward.  The patient has been utilizing a wheelchair to minimize pressure on his foot.  Patient will not be safe with an Ortho wedge healing shoe.         Current Assessment & Plan     The wound is doing quite well.  Continue antibiotic therapy.  Continue use of wheelchair to avoid pressure.  Iodosorb to wound.              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  The patient will be seen Dr. Rafal ma for evaluation in an attempt to try to reestablish  flow to his left foot.               See wound doc progress notes. Documents will be scanned.        Javon WILSON Hebert [facility]

## 2018-10-01 NOTE — ASSESSMENT & PLAN NOTE
The wound is doing quite well.  Continue antibiotic therapy.  Continue use of wheelchair to avoid pressure.  Iodosorb to wound.

## 2018-10-11 ENCOUNTER — OFFICE VISIT (OUTPATIENT)
Dept: WOUND CARE | Facility: HOSPITAL | Age: 83
End: 2018-10-11
Attending: SURGERY
Payer: MEDICARE

## 2018-10-11 DIAGNOSIS — I70.245 ATHSCL NATIVE ARTERIES OF LEFT LEG W ULCERATION OTH PRT FOOT: ICD-10-CM

## 2018-10-11 DIAGNOSIS — L97.522 ULCER OF LEFT FOOT, WITH FAT LAYER EXPOSED: Primary | ICD-10-CM

## 2018-10-11 PROCEDURE — 27201912 HC WOUND CARE DEBRIDEMENT SUPPLIES

## 2018-10-11 PROCEDURE — 99499 UNLISTED E&M SERVICE: CPT | Mod: ,,, | Performed by: SURGERY

## 2018-10-11 PROCEDURE — 11042 DBRDMT SUBQ TIS 1ST 20SQCM/<: CPT

## 2018-10-11 NOTE — ASSESSMENT & PLAN NOTE
Wound is getting smaller. Clean at present. Continue present plan and getting attempt at revascularization next week.

## 2018-10-11 NOTE — PROGRESS NOTES
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  The patient will be seen Dr. Rafal ma for evaluation in an attempt to try to reestablish flow to his left foot.         Current Assessment & Plan     Wound is getting smaller. Clean at present. Continue present plan and getting attempt at revascularization next week.                See wound doc progress notes. Documents will be scanned.        Leon Ziegler  [facility]

## 2018-10-18 PROBLEM — I73.9 PAD (PERIPHERAL ARTERY DISEASE): Status: ACTIVE | Noted: 2018-10-18

## 2018-10-18 PROBLEM — I34.0 NON-RHEUMATIC MITRAL REGURGITATION: Status: ACTIVE | Noted: 2018-10-18

## 2018-10-18 PROBLEM — E78.5 DYSLIPIDEMIA: Status: ACTIVE | Noted: 2018-10-18

## 2018-10-25 ENCOUNTER — OFFICE VISIT (OUTPATIENT)
Dept: WOUND CARE | Facility: HOSPITAL | Age: 83
End: 2018-10-25
Attending: SURGERY
Payer: MEDICARE

## 2018-10-25 VITALS
RESPIRATION RATE: 18 BRPM | SYSTOLIC BLOOD PRESSURE: 126 MMHG | TEMPERATURE: 98 F | DIASTOLIC BLOOD PRESSURE: 86 MMHG | HEART RATE: 75 BPM

## 2018-10-25 DIAGNOSIS — I70.245 ATHSCL NATIVE ARTERIES OF LEFT LEG W ULCERATION OTH PRT FOOT: ICD-10-CM

## 2018-10-25 PROCEDURE — 27201912 HC WOUND CARE DEBRIDEMENT SUPPLIES

## 2018-10-25 PROCEDURE — 99499 UNLISTED E&M SERVICE: CPT | Mod: ,,, | Performed by: SURGERY

## 2018-10-25 PROCEDURE — 11042 DBRDMT SUBQ TIS 1ST 20SQCM/<: CPT

## 2018-10-25 NOTE — PROGRESS NOTES
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  The patient will be seen Dr. Rafal ma for evaluation in an attempt to try to reestablish flow to his left foot.         Current Assessment & Plan     Had angioplasty and stent. Now with warm foot and no pain. Will now debride.                See wound doc progress notes. Documents will be scanned.        Leon Ziegler  [facility]

## 2018-11-01 ENCOUNTER — OFFICE VISIT (OUTPATIENT)
Dept: WOUND CARE | Facility: HOSPITAL | Age: 83
End: 2018-11-01
Attending: SURGERY
Payer: MEDICARE

## 2018-11-01 VITALS — TEMPERATURE: 98 F | SYSTOLIC BLOOD PRESSURE: 133 MMHG | HEART RATE: 74 BPM | DIASTOLIC BLOOD PRESSURE: 76 MMHG

## 2018-11-01 DIAGNOSIS — L97.526 NON-PRESSURE CHRONIC ULCER OF OTHER PART OF LEFT FOOT WITH BONE INVOLVEMENT WITHOUT EVIDENCE OF NECROSIS: ICD-10-CM

## 2018-11-01 PROCEDURE — 11042 DBRDMT SUBQ TIS 1ST 20SQCM/<: CPT

## 2018-11-01 PROCEDURE — 27201912 HC WOUND CARE DEBRIDEMENT SUPPLIES

## 2018-11-01 PROCEDURE — 99499 UNLISTED E&M SERVICE: CPT | Mod: ,,, | Performed by: SURGERY

## 2018-11-01 NOTE — PROGRESS NOTES
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 Pt on plavix and baby asa. Encouraged by cardiology to exercise to improve collateral circulation.  No bone palpated today.  Home health placing Iodoflex.  Wound seems to be getting smaller.    Location: left lateral foot    Duration: 1-5-2018    Context: arterial    Associated Signs & Symptoms:  Minimal pain    Timing: when walking    Severity: 3/10    Quality: sharp and burning    The patient was evaluated 09/20/2018 by Dr. Ziegler.  He was found to have what appeared to be soft tissue infection of the left foot associated with his left ft wound. He was admitted to [facility] for treatment.  He was determined to have an open metatarsophalangeal joint.  He was taken to the operating room where he underwent resection of the 5th metatarsal head and bone debridement.  A wound VAC was initiated however he could not tolerate it due to pain. He was treated based on culture results with oral antibiotic therapy upon discharge from the hospital.  While hospitalized, he did undergo repeat angiogram and attempt at crossing the occluded popliteal artery both from above and from below.  This was not successful by Dr. Edward.  The patient has been utilizing a wheelchair to minimize pressure on his foot.  Patient will not be safe with an Ortho wedge healing shoe.               See wound doc progress notes. Documents will be scanned.        Matias Noriega Jr  [facility]

## 2018-11-15 ENCOUNTER — OFFICE VISIT (OUTPATIENT)
Dept: WOUND CARE | Facility: HOSPITAL | Age: 83
End: 2018-11-15
Attending: SURGERY
Payer: MEDICARE

## 2018-11-15 VITALS — RESPIRATION RATE: 18 BRPM | DIASTOLIC BLOOD PRESSURE: 79 MMHG | SYSTOLIC BLOOD PRESSURE: 129 MMHG | HEART RATE: 76 BPM

## 2018-11-15 DIAGNOSIS — I70.245 ATHSCL NATIVE ARTERIES OF LEFT LEG W ULCERATION OTH PRT FOOT: Primary | ICD-10-CM

## 2018-11-15 DIAGNOSIS — L97.522 ULCER OF LEFT FOOT, WITH FAT LAYER EXPOSED: ICD-10-CM

## 2018-11-15 DIAGNOSIS — L97.526 NON-PRESSURE CHRONIC ULCER OF OTHER PART OF LEFT FOOT WITH BONE INVOLVEMENT WITHOUT EVIDENCE OF NECROSIS: ICD-10-CM

## 2018-11-15 PROCEDURE — 11042 DBRDMT SUBQ TIS 1ST 20SQCM/<: CPT

## 2018-11-15 PROCEDURE — 27201912 HC WOUND CARE DEBRIDEMENT SUPPLIES

## 2018-11-15 PROCEDURE — 99499 UNLISTED E&M SERVICE: CPT | Mod: ,,, | Performed by: SURGERY

## 2018-11-15 NOTE — ASSESSMENT & PLAN NOTE
Wound continues to improve. Continue present care. No invasive procedures successful yet. May consider retrograde attempt if wound heals.

## 2018-11-15 NOTE — PROGRESS NOTES
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  The patient will be seen Dr. Rafal ma for evaluation in an attempt to try to reestablish flow to his left foot.         Current Assessment & Plan     Wound continues to improve. Continue present care. No invasive procedures successful yet. May consider retrograde attempt if wound heals.             Other Visit Diagnoses     Ulcer of left foot, with fat layer exposed              See wound doc progress notes. Documents will be scanned.        Leon Ziegler  [facility]

## 2018-11-29 ENCOUNTER — OFFICE VISIT (OUTPATIENT)
Dept: WOUND CARE | Facility: HOSPITAL | Age: 83
End: 2018-11-29
Attending: SURGERY
Payer: MEDICARE

## 2018-11-29 VITALS
DIASTOLIC BLOOD PRESSURE: 64 MMHG | RESPIRATION RATE: 18 BRPM | SYSTOLIC BLOOD PRESSURE: 134 MMHG | HEART RATE: 51 BPM | TEMPERATURE: 98 F

## 2018-11-29 DIAGNOSIS — I70.245 ATHSCL NATIVE ARTERIES OF LEFT LEG W ULCERATION OTH PRT FOOT: Primary | ICD-10-CM

## 2018-11-29 DIAGNOSIS — L97.526 NON-PRESSURE CHRONIC ULCER OF OTHER PART OF LEFT FOOT WITH BONE INVOLVEMENT WITHOUT EVIDENCE OF NECROSIS: ICD-10-CM

## 2018-11-29 PROCEDURE — 99499 UNLISTED E&M SERVICE: CPT | Mod: ,,, | Performed by: SURGERY

## 2018-11-29 PROCEDURE — 99212 OFFICE O/P EST SF 10 MIN: CPT

## 2018-11-29 NOTE — PROGRESS NOTES
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  The patient will be seen Dr. Rafal ma for evaluation in an attempt to try to reestablish flow to his left foot.         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 Pt on plavix and baby asa. Encouraged by cardiology to exercise to improve collateral circulation.  No bone palpated today.  Home health placing Iodoflex.  Wound seems to be totally healed.    Location: left lateral foot    Duration: 1-5-2018    Context: arterial    Associated Signs & Symptoms:  Minimal pain    Timing: when walking    Severity: 3/10    Quality: sharp and burning    The patient was evaluated 09/20/2018 by Dr. Ziegler.  He was found to have what appeared to be soft tissue infection of the left foot associated with his left ft wound. He was admitted to [facility] for treatment.  He was determined to have an open metatarsophalangeal joint.  He was taken to the operating room where he underwent resection of the 5th metatarsal head and bone debridement.  A wound VAC was initiated however he could not tolerate it due to pain. He was treated based on culture results with oral antibiotic therapy upon discharge from the hospital.  While hospitalized, he did undergo repeat angiogram and attempt at crossing the occluded popliteal artery both from above and from below.  This was not successful by Dr. Edward.  The patient has been utilizing a wheelchair to minimize pressure on his foot.  Patient will not be safe with an Ortho wedge healing shoe.               See wound doc progress notes. Documents will be  scanned.        Matias Noriega Jr  [facility]

## 2019-01-16 NOTE — PROGRESS NOTES
I have reviewed the results of yesterday's office visit.  Both ESR and CRP elevated.  Ddx includes infectious vs rheumatologic vs other endocrinologic process.  White count normal.  Renal function reduced due to known renal failure but no dangerous abnormalities.   Chest x-ray performed in clinic was negative with exception of small bilateral pleural effusions.  This was read by radiology.  Would have expected larger effusions with more signficant pulm exam if this was an infection with primary pulmonary source.     I recommend the following:  -blood culture x 2 (will see if this can be done in clinic vs hospital vs dialysis)  -TSH  -return clinic visit 1/18/19  -return to ED if symptoms become any worse    Nursing will contact patient with instructions.         [facility]  Wound Care  Progress Note    Problem List Items Addressed This Visit        Derm    Ulcer of left foot, with fat layer exposed - Primary    Overview     HPI 85 yr old male with history of PVD, unable to improve circulation via intervention. Pt on plavix and baby asa. Encouraged by cardiology to exercise to improve collateral circulation.    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   HENT:   Head: Normocephalic.   Cardiovascular: Normal rate.    Pulmonary/Chest: Effort normal.   Musculoskeletal: Normal range of motion.   Neurological: He is alert and oriented to person, place, and time. Gait normal.   Skin: Skin is warm and dry.   Pt has an arterial ulcer to his left lateral distal foot with adipose exposed.   Psychiatric: Mood, memory, affect and judgment normal.   Vitals reviewed.    Callous removed and will continue collagen qod, will recheck in 2 weeks. Pt sees a doctor in , Dr. Gonzales, pt now has his own HBO chamber at home with an oxygen concentrator.   See wound doc progress notes. Documents will be scanned.        Sophy Mackenzie  [facility]

## 2020-01-03 PROBLEM — I48.3 TYPICAL ATRIAL FLUTTER: Status: ACTIVE | Noted: 2020-01-03

## 2021-09-18 NOTE — PATIENT INSTRUCTIONS
Please see wound care instructions which have been provided separately.      impaired balance/pain/decreased strength